# Patient Record
Sex: MALE | Race: WHITE
[De-identification: names, ages, dates, MRNs, and addresses within clinical notes are randomized per-mention and may not be internally consistent; named-entity substitution may affect disease eponyms.]

---

## 2018-03-23 ENCOUNTER — HOSPITAL ENCOUNTER (EMERGENCY)
Dept: HOSPITAL 72 - EMR | Age: 38
Discharge: HOME | End: 2018-03-23
Payer: COMMERCIAL

## 2018-03-23 VITALS — WEIGHT: 315 LBS | HEIGHT: 74 IN | BODY MASS INDEX: 40.43 KG/M2

## 2018-03-23 VITALS — SYSTOLIC BLOOD PRESSURE: 93 MMHG | DIASTOLIC BLOOD PRESSURE: 64 MMHG

## 2018-03-23 VITALS — SYSTOLIC BLOOD PRESSURE: 125 MMHG | DIASTOLIC BLOOD PRESSURE: 92 MMHG

## 2018-03-23 DIAGNOSIS — Z88.8: ICD-10-CM

## 2018-03-23 DIAGNOSIS — R10.9: Primary | ICD-10-CM

## 2018-03-23 DIAGNOSIS — Z87.442: ICD-10-CM

## 2018-03-23 DIAGNOSIS — I10: ICD-10-CM

## 2018-03-23 LAB
ADD MANUAL DIFF: NO
ALBUMIN SERPL-MCNC: 4 G/DL (ref 3.4–5)
ALBUMIN/GLOB SERPL: 1.1 {RATIO} (ref 1–2.7)
ALP SERPL-CCNC: 118 U/L (ref 46–116)
ALT SERPL-CCNC: 62 U/L (ref 12–78)
ANION GAP SERPL CALC-SCNC: 9 MMOL/L (ref 5–15)
APPEARANCE UR: CLEAR
APTT PPP: YELLOW S
AST SERPL-CCNC: 39 U/L (ref 15–37)
BASOPHILS NFR BLD AUTO: 1 % (ref 0–2)
BILIRUB DIRECT SERPL-MCNC: 0.3 MG/DL (ref 0–0.3)
BILIRUB SERPL-MCNC: 1.2 MG/DL (ref 0.2–1)
BUN SERPL-MCNC: 13 MG/DL (ref 7–18)
CALCIUM SERPL-MCNC: 8.5 MG/DL (ref 8.5–10.1)
CHLORIDE SERPL-SCNC: 100 MMOL/L (ref 98–107)
CO2 SERPL-SCNC: 28 MMOL/L (ref 21–32)
CREAT SERPL-MCNC: 1 MG/DL (ref 0.55–1.3)
EOSINOPHIL NFR BLD AUTO: 5.1 % (ref 0–3)
ERYTHROCYTE [DISTWIDTH] IN BLOOD BY AUTOMATED COUNT: 11.7 % (ref 11.6–14.8)
GLOBULIN SER-MCNC: 3.6 G/DL
GLUCOSE UR STRIP-MCNC: (no result) MG/DL
HCT VFR BLD CALC: 54.1 % (ref 42–52)
HGB BLD-MCNC: 18.2 G/DL (ref 14.2–18)
KETONES UR QL STRIP: (no result)
LEUKOCYTE ESTERASE UR QL STRIP: NEGATIVE
LYMPHOCYTES NFR BLD AUTO: 43.1 % (ref 20–45)
MCV RBC AUTO: 89 FL (ref 80–99)
MONOCYTES NFR BLD AUTO: 6.5 % (ref 1–10)
NEUTROPHILS NFR BLD AUTO: 44.4 % (ref 45–75)
NITRITE UR QL STRIP: NEGATIVE
PH UR STRIP: 6 [PH] (ref 4.5–8)
PLATELET # BLD: 283 K/UL (ref 150–450)
POTASSIUM SERPL-SCNC: 3.7 MMOL/L (ref 3.5–5.1)
PROT UR QL STRIP: (no result)
RBC # BLD AUTO: 6.07 M/UL (ref 4.7–6.1)
SODIUM SERPL-SCNC: 137 MMOL/L (ref 136–145)
SP GR UR STRIP: 1.02 (ref 1–1.03)
UROBILINOGEN UR-MCNC: NORMAL MG/DL (ref 0–1)
WBC # BLD AUTO: 8.4 K/UL (ref 4.8–10.8)

## 2018-03-23 PROCEDURE — 83690 ASSAY OF LIPASE: CPT

## 2018-03-23 PROCEDURE — 80053 COMPREHEN METABOLIC PANEL: CPT

## 2018-03-23 PROCEDURE — 85025 COMPLETE CBC W/AUTO DIFF WBC: CPT

## 2018-03-23 PROCEDURE — 96375 TX/PRO/DX INJ NEW DRUG ADDON: CPT

## 2018-03-23 PROCEDURE — 82248 BILIRUBIN DIRECT: CPT

## 2018-03-23 PROCEDURE — 81003 URINALYSIS AUTO W/O SCOPE: CPT

## 2018-03-23 PROCEDURE — 36415 COLL VENOUS BLD VENIPUNCTURE: CPT

## 2018-03-23 PROCEDURE — 99284 EMERGENCY DEPT VISIT MOD MDM: CPT

## 2018-03-23 PROCEDURE — 96374 THER/PROPH/DIAG INJ IV PUSH: CPT

## 2018-03-23 NOTE — EMERGENCY ROOM REPORT
History of Present Illness


General


Chief Complaint:  Male Urogenital Problems


Source:  Patient





Present Illness


HPI


37-year-old male, history of kidney stones, history of left-sided stent 

placement, presenting with flank pain.  Patient states that he has right-sided 

flank pain, radiating to groin, occurred this morning.  One episode of nausea 

and vomiting.  No fever no chills.  No gross hematuria.  States that this feels 

similar to his kidney stones.  States his last kidney stone was 2 months ago, 

which she received a CAT scan at [facility], states that it was 1-2 mm and then 

resolved on its own.


Allergies:  


Coded Allergies:  


     METOCLOPRAMIDE (Verified  Allergy, Unknown, 3/23/18)





Patient History


Past Medical History:  see triage record


Past Surgical History:  none


Pertinent Family History:  none


Reviewed Nursing Documentation:  PMH: Agreed; PSxH: Agreed





Nursing Documentation-PMH


Hx Hypertension:  Yes





Review of Systems


All Other Systems:  negative except mentioned in HPI





Physical Exam





Vital Signs








  Date Time  Temp Pulse Resp B/P (MAP) Pulse Ox O2 Delivery O2 Flow Rate FiO2


 


3/23/18 07:42 98.0 112 20 156/81 95 Room Air  





 98.1       








Sp02 EP Interpretation:  reviewed, normal


General Appearance:  alert, non-toxic, moderate distress


Head:  normocephalic, atraumatic


Eyes:  bilateral eye normal inspection, bilateral eye PERRL, bilateral eye EOMI


ENT:  normal ENT inspection, normal pharynx, normal voice, moist mucus membranes


Neck:  normal inspection, full range of motion, supple


Respiratory:  normal inspection, lungs clear, normal breath sounds, no 

respiratory distress, no retraction, no wheezing, speaking full sentences, 

chest symmetrical


Cardiovascular #1:  normal inspection, regular rate, rhythm, no edema, normal 

capillary refill


Cardiovascular #2:  2+ radial (R), 2+ radial (L)


Gastrointestinal:  normal inspection, non tender, soft, non-distended, no 

guarding


Genitourinary:  no CVA tenderness


Musculoskeletal:  normal inspection, back normal, normal range of motion, non-

tender


Neurologic:  normal inspection, alert, oriented x3, responsive, motor strength/

tone normal, sensory intact, normal gait, speech normal


Psychiatric:  normal inspection, judgement/insight normal, memory normal


Skin:  normal inspection, normal color, no rash, warm/dry, well hydrated, 

normal turgor





Medical Decision Making


Diagnostic Impression:  


 Primary Impression:  


 Flank pain


 Additional Impression:  


 History of kidney stones


ER Course


37-year-old male with history of renal stones presented with right sided flank 

pain x few hours





DDX:


nephrolithiasis VS infected stone vs. pyelonephritis vs. UTI





Plan:


Labs - cbc, bmp, ua, ucx


IVF


Renal ultrasound vs. CT abdo pelvis





ER course:


Labs: +UA with RBCs.


Creatinine normal


Renal US performed by me at bedside: Hydronephrosis on the left side which 

patient states is chronic from stent placement, very mild right-sided hydro


Shared decision making with patient, since patient has received multiple CAT 

scans, and right-sided Hydro very minimal, will hold CAT scan, just given IV 

fluids and pain control


Patient feels much better, pain scale 1-2


Will DC home with urology fu





Disposition:


Patient will be discharged to home with Rx for motrin, flomax. 


Patient will also be given rx for Norco for break-through pain. 


Patient instructed to follow up with urology within 1 week. 


Strict return precautions discussed with patient such as severe or worsening 

flank pain, high fever, chills, n/v. Patient verbalized understanding and 

agrees with plan.





Please note that this Emergency Department Report was dictated using [employer] technology software, occasionally this can lead to 

erroneous entry secondary to interpretation by the dictation equipment











Rhythm Strip


EP Interpretation: Yes


Rate: 91


Rhythm: NSR, no PVCs, no ectopy





Laboratory Tests








Test


  3/23/18


08:00


 


White Blood Count


  8.4 K/UL


(4.8-10.8)


 


Red Blood Count


  6.07 M/UL


(4.70-6.10)


 


Hemoglobin


  18.2 G/DL


(14.2-18.0)  *H


 


Hematocrit


  54.1 %


(42.0-52.0)  H


 


Mean Corpuscular Volume 89 FL (80-99)  


 


Mean Corpuscular Hemoglobin


  30.0 PG


(27.0-31.0)


 


Mean Corpuscular Hemoglobin


Concent 33.6 G/DL


(32.0-36.0)


 


Red Cell Distribution Width


  11.7 %


(11.6-14.8)


 


Platelet Count


  283 K/UL


(150-450)


 


Mean Platelet Volume


  6.6 FL


(6.5-10.1)


 


Neutrophils (%) (Auto)


  44.4 %


(45.0-75.0)  L


 


Lymphocytes (%) (Auto)


  43.1 %


(20.0-45.0)


 


Monocytes (%) (Auto)


  6.5 %


(1.0-10.0)


 


Eosinophils (%) (Auto)


  5.1 %


(0.0-3.0)  H


 


Basophils (%) (Auto)


  1.0 %


(0.0-2.0)


 


Urine Color Yellow  


 


Urine Appearance Clear  


 


Urine pH 6 (4.5-8.0)  


 


Urine Specific Gravity


  1.025


(1.005-1.035)


 


Urine Protein


  2+ (NEGATIVE)


H


 


Urine Glucose (UA)


  3+ (NEGATIVE)


H


 


Urine Ketones


  1+ (NEGATIVE)


H


 


Urine Occult Blood


  5+ (NEGATIVE)


H


 


Urine Nitrite


  Negative


(NEGATIVE)


 


Urine Bilirubin


  Negative


(NEGATIVE)


 


Urine Urobilinogen


  Normal MG/DL


(0.0-1.0)


 


Urine Leukocyte Esterase


  Negative


(NEGATIVE)


 


Urine RBC


  20-30 /HPF (0


- 0)  H


 


Urine WBC


  0-2 /HPF (0 -


0)


 


Urine Squamous Epithelial


Cells Occasional


/LPF


 


Urine Bacteria


  Few /HPF


(NONE)


 


Sodium Level


  137 MMOL/L


(136-145)


 


Potassium Level


  3.7 MMOL/L


(3.5-5.1)


 


Chloride Level


  100 MMOL/L


()


 


Carbon Dioxide Level


  28 MMOL/L


(21-32)


 


Anion Gap


  9 mmol/L


(5-15)


 


Blood Urea Nitrogen


  13 mg/dL


(7-18)


 


Creatinine


  1.0 MG/DL


(0.55-1.30)


 


Estimate Glomerular


Filtration Rate > 60 mL/min


(>60)


 


Glucose Level


  238 MG/DL


()  H


 


Calcium Level


  8.5 MG/DL


(8.5-10.1)


 


Total Bilirubin


  1.2 MG/DL


(0.2-1.0)  H


 


Direct Bilirubin


  0.3 MG/DL


(0.0-0.3)


 


Aspartate Amino Transferase


(AST) 39 U/L (15-37)


H


 


Alanine Aminotransferase (ALT)


  62 U/L (12-78)


 


 


Alkaline Phosphatase


  118 U/L


()  H


 


Total Protein


  7.6 G/DL


(6.4-8.2)


 


Albumin


  4.0 G/DL


(3.4-5.0)


 


Globulin 3.6 g/dL  


 


Albumin/Globulin Ratio 1.1 (1.0-2.7)  


 


Lipase


  212 U/L


()











Last Vital Signs








  Date Time  Temp Pulse Resp B/P (MAP) Pulse Ox O2 Delivery O2 Flow Rate FiO2


 


3/23/18 07:42 98.0 112 20 156/81 95 Room Air  





 98.1       








Disposition:  HOME, SELF-CARE


Condition:  Improved


Scripts


Hydrocodone Bit/Acetaminophen 5-325* (NORCO 5-325*) 1 Each Tablet


1 TAB ORAL Q6H PRN for For Pain, #10 TAB 0 Refills


   Prov: Jhonatan Landis M.D.         3/23/18 


Ibuprofen* (MOTRIN*) 600 Mg Tablet


600 MG ORAL Q8H PRN for For Pain, #30 TAB 0 Refills


   Prov: hJonatan Landis M.D.         3/23/18 


Tamsulosin Hcl (TAMSULOSIN HCL*) 0.4 Mg Cap.er.24h


0.4 MG ORAL BEDTIME, #14 CAP


   Prov: Jhonatan Landis M.D.         3/23/18











Jhonatan Landis M.D. Mar 23, 2018 08:09

## 2018-11-16 ENCOUNTER — HOSPITAL ENCOUNTER (EMERGENCY)
Dept: HOSPITAL 72 - EMR | Age: 38
Discharge: HOME | End: 2018-11-16
Payer: COMMERCIAL

## 2018-11-16 VITALS — DIASTOLIC BLOOD PRESSURE: 75 MMHG | SYSTOLIC BLOOD PRESSURE: 122 MMHG

## 2018-11-16 VITALS — DIASTOLIC BLOOD PRESSURE: 75 MMHG | SYSTOLIC BLOOD PRESSURE: 125 MMHG

## 2018-11-16 VITALS — HEIGHT: 74 IN | BODY MASS INDEX: 40.43 KG/M2 | WEIGHT: 315 LBS

## 2018-11-16 DIAGNOSIS — R10.9: ICD-10-CM

## 2018-11-16 DIAGNOSIS — Z87.442: ICD-10-CM

## 2018-11-16 DIAGNOSIS — R52: Primary | ICD-10-CM

## 2018-11-16 DIAGNOSIS — Z88.8: ICD-10-CM

## 2018-11-16 LAB
ADD MANUAL DIFF: NO
ANION GAP SERPL CALC-SCNC: 8 MMOL/L (ref 5–15)
APPEARANCE UR: CLEAR
APTT PPP: YELLOW S
BASOPHILS NFR BLD AUTO: 0.9 % (ref 0–2)
BUN SERPL-MCNC: 11 MG/DL (ref 7–18)
CALCIUM SERPL-MCNC: 9.7 MG/DL (ref 8.5–10.1)
CHLORIDE SERPL-SCNC: 101 MMOL/L (ref 98–107)
CO2 SERPL-SCNC: 31 MMOL/L (ref 21–32)
CREAT SERPL-MCNC: 1.2 MG/DL (ref 0.55–1.3)
EOSINOPHIL NFR BLD AUTO: 2.9 % (ref 0–3)
ERYTHROCYTE [DISTWIDTH] IN BLOOD BY AUTOMATED COUNT: 12.1 % (ref 11.6–14.8)
GLUCOSE UR STRIP-MCNC: NEGATIVE MG/DL
HCT VFR BLD CALC: 53.8 % (ref 42–52)
HGB BLD-MCNC: 18.3 G/DL (ref 14.2–18)
KETONES UR QL STRIP: NEGATIVE
LEUKOCYTE ESTERASE UR QL STRIP: NEGATIVE
LYMPHOCYTES NFR BLD AUTO: 33.8 % (ref 20–45)
MCV RBC AUTO: 84 FL (ref 80–99)
MONOCYTES NFR BLD AUTO: 5.8 % (ref 1–10)
NEUTROPHILS NFR BLD AUTO: 56.5 % (ref 45–75)
NITRITE UR QL STRIP: NEGATIVE
PH UR STRIP: 7 [PH] (ref 4.5–8)
PLATELET # BLD: 309 K/UL (ref 150–450)
POTASSIUM SERPL-SCNC: 4.1 MMOL/L (ref 3.5–5.1)
PROT UR QL STRIP: (no result)
RBC # BLD AUTO: 6.38 M/UL (ref 4.7–6.1)
SODIUM SERPL-SCNC: 140 MMOL/L (ref 136–145)
SP GR UR STRIP: 1.01 (ref 1–1.03)
UROBILINOGEN UR-MCNC: NORMAL MG/DL (ref 0–1)
WBC # BLD AUTO: 9.5 K/UL (ref 4.8–10.8)

## 2018-11-16 PROCEDURE — 76770 US EXAM ABDO BACK WALL COMP: CPT

## 2018-11-16 PROCEDURE — 81003 URINALYSIS AUTO W/O SCOPE: CPT

## 2018-11-16 PROCEDURE — 96375 TX/PRO/DX INJ NEW DRUG ADDON: CPT

## 2018-11-16 PROCEDURE — 96374 THER/PROPH/DIAG INJ IV PUSH: CPT

## 2018-11-16 PROCEDURE — 36415 COLL VENOUS BLD VENIPUNCTURE: CPT

## 2018-11-16 PROCEDURE — 85025 COMPLETE CBC W/AUTO DIFF WBC: CPT

## 2018-11-16 PROCEDURE — 99284 EMERGENCY DEPT VISIT MOD MDM: CPT

## 2018-11-16 PROCEDURE — 96376 TX/PRO/DX INJ SAME DRUG ADON: CPT

## 2018-11-16 PROCEDURE — 80048 BASIC METABOLIC PNL TOTAL CA: CPT

## 2018-11-16 PROCEDURE — 96361 HYDRATE IV INFUSION ADD-ON: CPT

## 2018-11-16 NOTE — DIAGNOSTIC IMAGING REPORT
Indication: Abdominal pain

 

Technique: Grayscale and duplex images of the kidneys, retroperitoneum, and bladder

were obtained.

 

Comparison: none            

 

Findings: Right kidney measures 12.4 cm in length. Left kidney measures 17.7 cm in

length. Suspect that the left renal length is exaggerated, as it was difficult to

visualize. Both kidneys demonstrate normal echogenicity. There is moderate left

hydronephrosis and proximal hydroureter.  No focal abnormality. Nonvisualized

inferior vena cava due to overlying bowel gas. Bladder is normal. Bilateral ureteral

jets are seen within the bladder.

 

Impression: Moderate left hydronephrosis. Etiology not demonstrated

 

Note inability to visualize inferior vena cava.

## 2018-11-16 NOTE — EMERGENCY ROOM REPORT
History of Present Illness


General


Chief Complaint:  Pain


Source:  Medical Record





Present Illness


HPI


This is a 38-year-old male with a history of known kidney stones and is 

followed up by a urologist at [facility], who complains of severe left flank 

pain related to left abdomen that started about 3 hours prior to arrival.  

Interim pain.  No exacerbating or leading factor.  Associated nausea and 

vomiting.  Denies any dysuria or fever.  Similar pain to previous episodes.


Allergies:  


Coded Allergies:  


     METOCLOPRAMIDE (Verified  Allergy, Unknown, 3/23/18)





Patient History


Past Medical History:  HTN


Past Surgical History:  none


Pertinent Family History:  none





Nursing Documentation-[facility]


Past Medical History:  No History, Except For


Hx Hypertension:  Yes





Review of Systems


All Other Systems:  negative except mentioned in HPI





Physical Exam





Vital Signs








  Date Time  Temp Pulse Resp B/P (MAP) Pulse Ox O2 Delivery O2 Flow Rate FiO2


 


11/16/18 15:05 97.9 101 26 162/113 97 Room Air  








General Appearance:  no apparent distress, alert, GCS 15, non-toxic


Eyes:  bilateral eye normal inspection, bilateral eye PERRL


Respiratory:  chest non-tender, lungs clear, normal breath sounds, speaking 

full sentences


Cardiovascular #1:  regular rate, rhythm, no edema


Gastrointestinal:  normal inspection, normal bowel sounds, non tender, soft


Genitourinary:  CVA tenderness (L)


Neurologic:  normal inspection, alert, oriented x3, responsive





Medical Decision Making


Diagnostic Impression:  


 Primary Impression:  


 Pain


 Additional Impression:  


 Flank pain


ER Course


Patient presented with significant complexes or risk requiring multiple 

evaluations.  Respiratory very concerned about possible obstruction, infected 

stone.  Patient does have a history of kidney stones.  He was given IV Dilaudid 

narcotic medications.  He has improved significantly.  IV fluid was given for 

volume resuscitation.  Did review the patient's blood work and urinalysis.  I 

see no signs of infection.  I see no signs of kidney failure or obstructive 

uropathy.  Given this fact, the patient will be discharged home.  He is 

tolerating a by mouth challenge.  He is alert.  He is nontoxic-appearing.





Laboratory Tests








Test


  11/16/18


15:17 11/16/18


17:20


 


White Blood Count


  9.5 K/UL


(4.8-10.8) 


 


 


Red Blood Count


  6.38 M/UL


(4.70-6.10)  H 


 


 


Hemoglobin


  18.3 G/DL


(14.2-18.0)  *H 


 


 


Hematocrit


  53.8 %


(42.0-52.0)  H 


 


 


Mean Corpuscular Volume 84 FL (80-99)   


 


Mean Corpuscular Hemoglobin


  28.6 PG


(27.0-31.0) 


 


 


Mean Corpuscular Hemoglobin


Concent 34.0 G/DL


(32.0-36.0) 


 


 


Red Cell Distribution Width


  12.1 %


(11.6-14.8) 


 


 


Platelet Count


  309 K/UL


(150-450) 


 


 


Mean Platelet Volume


  6.3 FL


(6.5-10.1)  L 


 


 


Neutrophils (%) (Auto)


  56.5 %


(45.0-75.0) 


 


 


Lymphocytes (%) (Auto)


  33.8 %


(20.0-45.0) 


 


 


Monocytes (%) (Auto)


  5.8 %


(1.0-10.0) 


 


 


Eosinophils (%) (Auto)


  2.9 %


(0.0-3.0) 


 


 


Basophils (%) (Auto)


  0.9 %


(0.0-2.0) 


 


 


Sodium Level


  140 MMOL/L


(136-145) 


 


 


Potassium Level


  4.1 MMOL/L


(3.5-5.1) 


 


 


Chloride Level


  101 MMOL/L


() 


 


 


Carbon Dioxide Level


  31 MMOL/L


(21-32) 


 


 


Anion Gap


  8 mmol/L


(5-15) 


 


 


Blood Urea Nitrogen


  11 mg/dL


(7-18) 


 


 


Creatinine


  1.2 MG/DL


(0.55-1.30) 


 


 


Estimate Glomerular


Filtration Rate > 60 mL/min


(>60) 


 


 


Glucose Level


  188 MG/DL


()  H 


 


 


Calcium Level


  9.7 MG/DL


(8.5-10.1) 


 


 


Urine Color  Yellow  


 


Urine Appearance  Clear  


 


Urine pH  7 (4.5-8.0)  


 


Urine Specific Gravity


  


  1.010


(1.005-1.035)


 


Urine Protein


  


  2+ (NEGATIVE)


H


 


Urine Glucose (UA)


  


  Negative


(NEGATIVE)


 


Urine Ketones


  


  Negative


(NEGATIVE)


 


Urine Blood


  


  1+ (NEGATIVE)


H


 


Urine Nitrite


  


  Negative


(NEGATIVE)


 


Urine Bilirubin


  


  Negative


(NEGATIVE)


 


Urine Urobilinogen


  


  Normal MG/DL


(0.0-1.0)


 


Urine Leukocyte Esterase


  


  Negative


(NEGATIVE)


 


Urine RBC


  


  0-2 /HPF (0 -


0)  H


 


Urine WBC


  


  0-2 /HPF (0 -


0)


 


Urine Squamous Epithelial


Cells 


  None /LPF


(NONE/OCC)


 


Urine Bacteria


  


  Few /HPF


(NONE)








CT/MRI/US Diagnostic Results


CT/MRI/US Diagnostic Results :  


   Impression


kidney ultrasound shows moderate left  hydronephros





Last Vital Signs








  Date Time  Temp Pulse Resp B/P (MAP) Pulse Ox O2 Delivery O2 Flow Rate FiO2


 


11/16/18 15:05 97.9 101 26 162/113 97 Room Air  








Status:  improved


Disposition:  HOME, SELF-CARE


Condition:  Stable


Scripts


Tamsulosin Hcl (TAMSULOSIN HCL*) 0.4 Mg Cap.er.24h


0.4 MG ORAL BEDTIME for 7 Days, CAP


   Prov: NIRANJAN JESUS         11/16/18 


Ondansetron (Zofran) 4 Mg Tablet


4 MG ORAL Q6H PRN for Nausea & Vomiting, #30 TAB 0 Refills


   Prov: NIRANJAN JESUS         11/16/18 


Hydrocodone Bit/Acetaminophen 5-325* (NORCO 5-325*) 1 Each Tablet


1 TAB ORAL Q6H PRN for For Pain, #20 TAB 0 Refills


   Prov: NIRANJAN JESUS         11/16/18


Patient Instructions:  Kidney Stones, Easy-to-Read











NIRANJAN JESUS Nov 16, 2018 15:14